# Patient Record
Sex: FEMALE | Race: WHITE | NOT HISPANIC OR LATINO | RURAL
[De-identification: names, ages, dates, MRNs, and addresses within clinical notes are randomized per-mention and may not be internally consistent; named-entity substitution may affect disease eponyms.]

---

## 2024-11-07 ENCOUNTER — HOSPITAL ENCOUNTER (EMERGENCY)
Facility: HOSPITAL | Age: 44
Discharge: HOME | End: 2024-11-07
Attending: INTERNAL MEDICINE
Payer: COMMERCIAL

## 2024-11-07 VITALS
HEART RATE: 82 BPM | RESPIRATION RATE: 16 BRPM | OXYGEN SATURATION: 96 % | SYSTOLIC BLOOD PRESSURE: 146 MMHG | HEIGHT: 68 IN | TEMPERATURE: 98 F | WEIGHT: 283 LBS | BODY MASS INDEX: 42.89 KG/M2 | DIASTOLIC BLOOD PRESSURE: 90 MMHG

## 2024-11-07 DIAGNOSIS — T14.90XA INJURY: ICD-10-CM

## 2024-11-07 DIAGNOSIS — G89.29 CHRONIC PAIN OF RIGHT ANKLE: Primary | ICD-10-CM

## 2024-11-07 DIAGNOSIS — M25.571 CHRONIC PAIN OF RIGHT ANKLE: Primary | ICD-10-CM

## 2024-11-07 PROCEDURE — 99283 EMERGENCY DEPT VISIT LOW MDM: CPT | Mod: 25

## 2024-11-07 PROCEDURE — 99284 EMERGENCY DEPT VISIT MOD MDM: CPT | Mod: ,,, | Performed by: INTERNAL MEDICINE

## 2024-11-07 RX ORDER — LEVOCETIRIZINE DIHYDROCHLORIDE 5 MG/1
5 TABLET, FILM COATED ORAL NIGHTLY
COMMUNITY

## 2024-11-07 RX ORDER — NAPROXEN 500 MG/1
500 TABLET ORAL 2 TIMES DAILY WITH MEALS
Qty: 20 TABLET | Refills: 0 | Status: SHIPPED | OUTPATIENT
Start: 2024-11-07 | End: 2024-11-17

## 2024-11-07 NOTE — ED TRIAGE NOTES
Pt to ER with c/o pain to to right ankle onset 8 weeks ago due to a fall. Pt states it isnt healing and became worse yesterday. Pain is a 10 on 0-10 scale.

## 2024-11-07 NOTE — ED PROVIDER NOTES
Encounter Date: 11/7/2024       History     Chief Complaint   Patient presents with    Ankle Pain     Patient presents with right ankle pain since 2 months ago.  The patient is a 16 with a , she states he stepped off the truck wrong when she was in [place], and twisted the ankle.  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  The patient was here in [place] for 1 week, and she wants the ankle evaluated.  She use no assistive devices or difficulty driving.  No previous injury of that ankle.        Review of patient's allergies indicates:  No Known Allergies  Past Medical History:   Diagnosis Date    Diabetes mellitus      Past Surgical History:   Procedure Laterality Date    CHOLECYSTECTOMY      HYSTERECTOMY      TONSILLECTOMY       No family history on file.  Social History     Tobacco Use    Smoking status: Never    Smokeless tobacco: Never   Substance Use Topics    Alcohol use: Never    Drug use: Never     Review of Systems   Constitutional:  Negative for fever.   HENT:  Negative for sore throat.    Respiratory:  Negative for shortness of breath.    Cardiovascular:  Negative for chest pain.   Gastrointestinal:  Negative for nausea.   Genitourinary:  Negative for dysuria.   Musculoskeletal:  Negative for back pain.   Skin:  Negative for rash.   Neurological:  Negative for weakness.   Hematological:  Does not bruise/bleed easily.       Physical Exam     Initial Vitals   BP Pulse Resp Temp SpO2   11/07/24 1131 11/07/24 1131 11/07/24 1132 11/07/24 1131 11/07/24 1131   (!) 146/90 82 16 98.1 °F (36.7 °C) 96 %      MAP       --                Physical Exam    Vitals reviewed.  Constitutional: She appears well-developed.   Eyes: Pupils are equal, round, and reactive to light.   Neck:   Normal range of motion.  Cardiovascular:  Normal rate, regular rhythm, normal heart sounds and normal pulses.           Pulmonary/Chest: Effort normal and breath sounds  normal.   Abdominal: Abdomen is soft.   Musculoskeletal:         General: Normal range of motion.      Cervical back: Normal range of motion.      Right ankle: No swelling. No tenderness. Normal range of motion. Normal pulse.      Right Achilles Tendon: Normal.      Left ankle: Normal.     Neurological: She is alert. She has normal strength and normal reflexes. No cranial nerve deficit. GCS eye subscore is 4. GCS verbal subscore is 5. GCS motor subscore is 6.   Skin: Skin is warm.   Psychiatric: She has a normal mood and affect.         Medical Screening Exam   See Full Note    ED Course   Procedures  Labs Reviewed - No data to display       Imaging Results              X-Ray Ankle Complete Right (Final result)  Result time 11/07/24 12:34:38      Final result by Kurt Camejo MD (11/07/24 12:34:38)                   Impression:      1. Soft tissue swelling without acute fracture.  2. Prominent calcaneal spurs.      Electronically signed by: Kurt Camejo  Date:    11/07/2024  Time:    12:34               Narrative:    EXAMINATION:  jXR ANKLE COMPLETE 3 VIEW RIGHT    CLINICAL HISTORY:  Injury, unspecified, initial encounter    TECHNIQUE:  AP, lateral, and oblique images of the right ankle were performed.    COMPARISON:  None    FINDINGS:  There is soft tissue swelling.  No acute fracture.  Bony ossicle at the medial malleolus versus posttraumatic trauma.  The tibiotalar articulation is intact.  Prominent dorsal and plantar calcaneal spurs.                                       Medications - No data to display  Medical Decision Making  Right ankle injury 2 months ago rule out fracture dislocation.    Amount and/or Complexity of Data Reviewed  Radiology: ordered. Decision-making details documented in ED Course.  Discussion of management or test interpretation with external provider(s): X-ray shows no fracture dislocation.  There was some spurs.  Discussed these findings with the patient.  Advised her since has  been over 2 months, with best be follow up with orthopedic doctor rule out any tendon injury or scar tissue.  Will started on nonsteroidal anti-inflammatories, she can be referred to a local clinic here if she needs follow up.               ED Course as of 11/07/24 1243   Thu Nov 07, 2024   1240 X-Ray Ankle Complete Right [PW]      ED Course User Index  [PW] Bry Villagomez MD                           Clinical Impression:   Final diagnoses:  [M25.571, G89.29] Chronic pain of right ankle (Primary)        ED Disposition Condition    Discharge Stable          ED Prescriptions       Medication Sig Dispense Start Date End Date Auth. Provider    naproxen (NAPROSYN) 500 MG tablet Take 1 tablet (500 mg total) by mouth 2 (two) times daily with meals. for 10 days 20 tablet 11/7/2024 11/17/2024 Bry Villagomez MD          Follow-up Information       Follow up With Specialties Details Why Contact Info    Neema Cancino MD Family Medicine On 11/11/2024  [address] [place] [address]  [phone]               Bry Villagomez MD  11/07/24 1249